# Patient Record
Sex: MALE | Race: WHITE | NOT HISPANIC OR LATINO | Employment: PART TIME | URBAN - METROPOLITAN AREA
[De-identification: names, ages, dates, MRNs, and addresses within clinical notes are randomized per-mention and may not be internally consistent; named-entity substitution may affect disease eponyms.]

---

## 2017-09-05 ENCOUNTER — HOSPITAL ENCOUNTER (EMERGENCY)
Facility: MEDICAL CENTER | Age: 82
End: 2017-09-05
Attending: EMERGENCY MEDICINE
Payer: MEDICARE

## 2017-09-05 ENCOUNTER — APPOINTMENT (OUTPATIENT)
Dept: RADIOLOGY | Facility: MEDICAL CENTER | Age: 82
End: 2017-09-05
Attending: EMERGENCY MEDICINE
Payer: MEDICARE

## 2017-09-05 VITALS
HEIGHT: 73 IN | SYSTOLIC BLOOD PRESSURE: 150 MMHG | OXYGEN SATURATION: 98 % | RESPIRATION RATE: 16 BRPM | TEMPERATURE: 99 F | WEIGHT: 215 LBS | DIASTOLIC BLOOD PRESSURE: 93 MMHG | BODY MASS INDEX: 28.49 KG/M2 | HEART RATE: 97 BPM
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
## 2017-09-05 DIAGNOSIS — T07.XXXA MULTIPLE ABRASIONS: ICD-10-CM

## 2017-09-05 PROCEDURE — 304217 HCHG IRRIGATION SYSTEM

## 2017-09-05 PROCEDURE — 99284 EMERGENCY DEPT VISIT MOD MDM: CPT

## 2017-09-05 PROCEDURE — 73130 X-RAY EXAM OF HAND: CPT | Mod: RT

## 2017-09-05 PROCEDURE — 70450 CT HEAD/BRAIN W/O DYE: CPT

## 2017-09-05 PROCEDURE — 303485 HCHG DRESSING MEDIUM

## 2017-09-05 PROCEDURE — 303484 HCHG DRESSING LARGE

## 2017-09-05 PROCEDURE — 73090 X-RAY EXAM OF FOREARM: CPT | Mod: LT

## 2017-09-05 ASSESSMENT — PAIN SCALES - GENERAL: PAINLEVEL_OUTOF10: ASSUMED PAIN PRESENT

## 2017-09-05 NOTE — ED PROVIDER NOTES
[facility] Provider Note    Scribed for Charmaine Daily M.D. by Arthur Machado. 9/5/2017, 12:58 PM.    Primary care provider: Pcp Pt states none  Means of arrival: Ambulance  History obtained from: Patient  History limited by: None    CHIEF COMPLAINT  Chief Complaint   Patient presents with   • T-5000 FALL     [place] the airport [place] an escal[place] when he reached around for his suitcase and fell face forward onto the escalator   • Open Wound     pt has multiple wounds to face from escalator teeth as well as bilat hands   • Facial Laceration     to bridge of nose     HPI  Gerald Dodd is a 82 y.o. male who presents to the Emergency Department after being brought in by ambulance status post ground level fall onset this morning at 11:30PM. The patient states he was going up an escalator at the airport today when he reached down for his luggage and fell backwards/sideways onto the escalator. He developed multiple open wounds to bilateral hands and face, head pain, right hand pain, and right middle finger swelling immediately afterwards. Patient denies any loss of consciousness or confusion and was able to recall the event. He denies any headache or nausea. The patient medicates with 1/2 Aspirin daily, but does not take any other anticoagulants. He is closely followed by primary care doctor believes that his vaccinations are up-to-date    REVIEW OF SYSTEMS  Pertinent positives include fall, open wounds to bilateral hands and face, head pain, right hand pain, and right middle finger swelling. Pertinent negatives include no headache or nausea. See HPI for further details. E.     PAST MEDICAL HISTORY   has a past medical history of DM II (diabetes mellitus, type II), controlled (CMS-HCC); HTN (hypertension); and Hyperlipidemia.    SURGICAL HISTORY  patient denies any surgical history    SOCIAL HISTORY  Social History   Substance Use Topics   • Smoking status: Light Tobacco Smoker     Types: Pipe   • Smokeless tobacco:  "Never Used   • Alcohol use Yes      Comment: occasional      History   Drug Use No     FAMILY HISTORY  History reviewed. No pertinent family history.    CURRENT MEDICATIONS  Simvastatin at night   Omeprazole at night   Fish oil x2   Multivitamin  1/2 Aspirin  Lisinopril   Metformin (Discontinued)     ALLERGIES  No Known Allergies    PHYSICAL EXAM  VITAL SIGNS: BP (!) 179/75   Pulse 70   Temp 37.2 °C (99 °F)   Resp 17   Ht 1.854 m (6' 1\")   Wt 97.5 kg (215 lb)   SpO2 97%   BMI 28.37 kg/m²   Constitutional: Alert in no apparent distress.  HENT: Multiple abrasions over forehead and face. Bilateral external ears normal, Nose normal.   Eyes: Pupils are equal and reactive, Conjunctiva normal, Non-icteric.   Neck: Normal range of motion, No tenderness, Supple, No stridor.   Cardiovascular: Regular rate and rhythm, no murmurs.   Thorax & Lungs: Normal breath sounds, No respiratory distress, No wheezing, No chest tenderness.   Abdomen: Bowel sounds normal, Soft, No tenderness, No masses, No peritoneal signs.  Skin: Warm, Dry.  Musculoskeletal:  Right hand has multiple teeth like abrasions with mild bony tenderness to middle phlanax. Left upper extremity with large hematoma over mediial forearm with underlying bony tenderness, superficial abrasion over left hand. Very superficial abrasion to left knee.   Neurologic: Alert and oriented x3, moving all extremities without difficulty, no focal deficits.    RADIOLOGY  CT-HEAD W/O   Final Result         NO ACUTE ABNORMALITIES ARE NOTED ON CT SCAN OF THE HEAD.      Findings are consistent with atrophy.  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      DX-HAND 3+ RIGHT   Final Result      1.  Small fragment could represent an avulsion fracture fragment adjacent to the distal end of the proximal phalanx of the fourth metacarpal bone. Soft tissue swelling is noted. Differential diagnosis would include calcification " or old fracture.      2.  No other fractures identified.      DX-FOREARM LEFT   Final Result      No evidence of fracture or dislocation.      The radiologist's interpretation of all radiological studies have been reviewed by me.    COURSE & MEDICAL DECISION MAKING  Pertinent Labs & Imaging studies reviewed. (See chart for details)    12:58 PM - Patient seen and examined at bedside. Informed the patient about ordering CT-head given the patient medicates with 1/2 Aspirin and is at risk for intracranial bleed. Ordered DX-hand right and DX-forearm left to evaluate his symptoms.     2:33 PM Reviewed the patient's imaging results as shown above.     2:48 PM Patient was reevaluated at bedside. He continues to rest comfortably in bed. Discussed radiology results with the patient and informed them that there is no evidence of intracranial bleed on CT-scan. Recommended ice therapy to relieve swelling. He will have his wounds cleaned and dressed prior to discharge. The patient will be discharged and should return if symptoms worsen or if new symptoms arise. The patient understands and agrees to plan.        The patient will return for new or worsening symptoms and is stable at the time of discharge. Patient was given return precautions. Patient and/or family member verbalizes understanding and will comply.    DISPOSITION:  Patient will be discharged home in stable condition.    FOLLOW UP:  [facility], Emergency Dept  [address] [id number] [phone]    Return for worsening pain, redness, fever or other concerns    FINAL IMPRESSION  1. Multiple abrasions    2. Fall, initial encounter        This dictation has been created using voice recognition software and/or scribes. The accuracy of the dictation is limited by the abilities of the software and the expertise of the scribes. I expect there may be some errors of grammar and possibly content. I made every attempt to manually correct the  errors within my dictation. However, errors related to voice recognition software and/or scribes may still exist and should be interpreted within the appropriate context.     I, Arthur Machado (Scribe), am scribing for, and in the presence of, Charmaine Daily M.D..    Electronically signed by: Arthur Machado (Scribe), 9/5/2017    ICharmaine M.D. personally performed the services described in this documentation, as scribed by Arthur Machado in my presence, and it is both accurate and complete.    The note accurately reflects work and decisions made by me.  Charmaine Daily  9/5/2017  7:02 PM

## 2017-09-05 NOTE — DISCHARGE INSTRUCTIONS
Abrasion  An abrasion is a cut or scrape on the outer surface of your skin. An abrasion does not extend through all of the layers of your skin. It is important to care for your abrasion properly to prevent infection.  CAUSES  Most abrasions are caused by falling on or gliding across the ground or another surface. When your skin rubs on something, the outer and inner layer of skin rubs off.   SYMPTOMS  A cut or scrape is the main symptom of this condition. The scrape may be bleeding, or it may appear red or pink. If there was an associated fall, there may be an underlying bruise.  DIAGNOSIS  An abrasion is diagnosed with a physical exam.  TREATMENT  Treatment for this condition depends on how large and deep the abrasion is. Usually, your abrasion will be cleaned with water and mild soap. This removes any dirt or debris that may be stuck. An antibiotic ointment may be applied to the abrasion to help prevent infection. A bandage (dressing) may be placed on the abrasion to keep it clean.  You may also need a tetanus shot.  HOME CARE INSTRUCTIONS  Medicines  · Take or apply medicines only as directed by your health care provider.  · If you were prescribed an antibiotic ointment, finish all of it even if you start to feel better.  Wound Care  · Clean the wound with mild soap and water 2-3 times per day or as directed by your health care provider. Pat your wound dry with a clean towel. Do not rub it.  · There are many different ways to close and cover a wound. Follow instructions from your health care provider about:  ¨ Wound care.  ¨ Dressing changes and removal.  · Check your wound every day for signs of infection. Watch for:  ¨ Redness, swelling, or pain.  ¨ Fluid, blood, or pus.  General Instructions  · Keep the dressing dry as directed by your health care provider. Do not take baths, swim, use a hot tub, or do anything that would put your wound underwater until your health care provider approves.  · If there is  swelling, raise (elevate) the injured area above the level of your heart while you are sitting or lying down.  · Keep all follow-up visits as directed by your health care provider. This is important.  SEEK MEDICAL CARE IF:  · You received a tetanus shot and you have swelling, severe pain, redness, or bleeding at the injection site.  · Your pain is not controlled with medicine.  · You have increased redness, swelling, or pain at the site of your wound.  SEEK IMMEDIATE MEDICAL CARE IF:  · You have a red streak going away from your wound.  · You have a fever.  · You have fluid, blood, or pus coming from your wound.  · You notice a bad smell coming from your wound or your dressing.     This information is not intended to replace advice given to you by your health care provider. Make sure you discuss any questions you have with your health care provider.     Document Released: 09/27/2006 Document Revised: 05/03/2016 Document Reviewed: 12/16/2015  [employer] Interactive Patient Education ©2016 [employer]

## 2017-09-05 NOTE — ED NOTES
"Gerald Yousif 82 y.o. 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  Pt a/o x 4, denies LOC.  Pt reports his wife is currently flying home to [place].  Pt bandaged by EMS pta.  FSBS 129.    BP (!) 179/75   Pulse 70   Temp 37.2 °C (99 °F)   Resp 17   Ht 1.854 m (6' 1\")   Wt 97.5 kg (215 lb)   SpO2 97%   BMI 28.37 kg/m²     "

## 2017-09-05 NOTE — ED NOTES
Pt given d/c instructions and f/u info with verbal understanding.  VSS at discharge.   Pt ambulatory from the ED w/ steady gait.  All belongings in possession on discharge.  Pt escorted to the lobby by RN.

## 2017-09-06 ENCOUNTER — PATIENT OUTREACH (OUTPATIENT)
Dept: HEALTH INFORMATION MANAGEMENT | Facility: OTHER | Age: 82
End: 2017-09-06

## 2017-09-06 NOTE — PROGRESS NOTES
9/6/17 at 8:28 AM--Received incoming VM from pt 9/5/17 at 5:26 PM.  Pt states on VM that he is at [facility] and needs a letter from ERP s/p ER discharge 9/5/17 verifying that he is able to fly back home to [place].  Placed phone call to patient, pt states he was able to get in touch with ER 9/5/17 to get doctor's note for [employer] and states he is back home in [place].  Pt states that he has no further needs at this time.